# Patient Record
Sex: FEMALE | Race: OTHER | URBAN - METROPOLITAN AREA
[De-identification: names, ages, dates, MRNs, and addresses within clinical notes are randomized per-mention and may not be internally consistent; named-entity substitution may affect disease eponyms.]

---

## 2020-08-14 NOTE — PATIENT DISCUSSION
"""Dry eyes OU seen on todays exam."" ""Continue Artificial tears both eyes two - three times a day . """

## 2021-04-02 ENCOUNTER — IMPORTED ENCOUNTER (OUTPATIENT)
Dept: URBAN - METROPOLITAN AREA CLINIC 9 | Facility: CLINIC | Age: 66
End: 2021-04-02

## 2021-04-02 PROBLEM — H25.13: Noted: 2021-04-02

## 2021-04-02 PROBLEM — H25.13: Status: DETERIORATING | Noted: 2021-04-02

## 2021-04-02 PROBLEM — H40.1131: Noted: 2021-04-02

## 2021-04-02 PROBLEM — H40.1131: Status: STABILIZING | Noted: 2021-04-02

## 2021-10-16 ASSESSMENT — TONOMETRY
OS_IOP_MMHG: 17
OD_IOP_MMHG: 20

## 2021-10-16 ASSESSMENT — VISUAL ACUITY
OS_CC: 20/25 SN
OD_CC: 20/40 SN

## 2021-12-06 ENCOUNTER — TECH ONLY (OUTPATIENT)
Dept: URBAN - METROPOLITAN AREA CLINIC 15 | Facility: CLINIC | Age: 66
End: 2021-12-06

## 2021-12-06 DIAGNOSIS — H40.1131: ICD-10-CM

## 2021-12-06 PROCEDURE — 99211T TECH SERVICE

## 2021-12-06 ASSESSMENT — TONOMETRY
OS_IOP_MMHG: 15
OD_IOP_MMHG: 15

## 2022-02-01 ENCOUNTER — ESTABLISHED PATIENT (OUTPATIENT)
Dept: URBAN - METROPOLITAN AREA CLINIC 15 | Facility: CLINIC | Age: 67
End: 2022-02-01

## 2022-02-01 DIAGNOSIS — H25.13: ICD-10-CM

## 2022-02-01 DIAGNOSIS — H40.1131: ICD-10-CM

## 2022-02-01 PROCEDURE — 92133 CPTRZD OPH DX IMG PST SGM ON: CPT

## 2022-02-01 PROCEDURE — 92014 COMPRE OPH EXAM EST PT 1/>: CPT

## 2022-02-01 ASSESSMENT — VISUAL ACUITY
OD_CC: 20/30+1
OS_CC: 20/25-2

## 2022-02-01 ASSESSMENT — TONOMETRY
OD_IOP_MMHG: 16
OS_IOP_MMHG: 17

## 2022-07-07 RX ORDER — ALBUTEROL SULFATE 90 UG/1
AEROSOL, METERED RESPIRATORY (INHALATION)
COMMUNITY
Start: 2022-03-29

## 2022-07-07 RX ORDER — GUAIFENESIN 600 MG/1
TABLET, EXTENDED RELEASE ORAL
COMMUNITY

## 2022-07-07 RX ORDER — METOPROLOL SUCCINATE 50 MG/1
TABLET, EXTENDED RELEASE ORAL
COMMUNITY

## 2022-07-07 RX ORDER — EPINEPHRINE 0.3 MG/.3ML
INJECTION SUBCUTANEOUS
COMMUNITY

## 2022-07-07 RX ORDER — PRAVASTATIN SODIUM 40 MG
TABLET ORAL
COMMUNITY

## 2022-07-07 RX ORDER — LATANOPROST 50 UG/ML
SOLUTION/ DROPS OPHTHALMIC
COMMUNITY

## 2022-08-26 ENCOUNTER — ESTABLISHED PATIENT (OUTPATIENT)
Dept: URBAN - METROPOLITAN AREA CLINIC 14 | Facility: CLINIC | Age: 67
End: 2022-08-26

## 2022-08-26 DIAGNOSIS — H25.13: ICD-10-CM

## 2022-08-26 DIAGNOSIS — H40.1131: ICD-10-CM

## 2022-08-26 PROCEDURE — 92015 DETERMINE REFRACTIVE STATE: CPT

## 2022-08-26 PROCEDURE — 92014 COMPRE OPH EXAM EST PT 1/>: CPT

## 2022-08-26 ASSESSMENT — VISUAL ACUITY
OD_CC: 20/200
OS_CC: 20/50
OU_CC: 20/40

## 2022-08-26 ASSESSMENT — TONOMETRY
OS_IOP_MMHG: 16
OD_IOP_MMHG: 16

## 2022-10-06 NOTE — PATIENT DISCUSSION
Discussed diagnosis of subconjunctival hemorrhage with the patient. Explained that the patient should monitor for any additional unexplained bruising or bleeding. If they do experience this, follow up with their primary care physician to evaluate for a blood clotting disorder. Continue to monitor. The patient can use artificial tears as needed for itching and irritation.

## 2022-11-16 ENCOUNTER — TECH ONLY (OUTPATIENT)
Dept: URBAN - METROPOLITAN AREA CLINIC 6 | Facility: CLINIC | Age: 67
End: 2022-11-16

## 2022-11-16 DIAGNOSIS — H40.1131: ICD-10-CM

## 2022-11-16 PROCEDURE — 92083 EXTENDED VISUAL FIELD XM: CPT

## 2022-12-06 ENCOUNTER — ESTABLISHED PATIENT (OUTPATIENT)
Dept: URBAN - METROPOLITAN AREA CLINIC 14 | Facility: CLINIC | Age: 67
End: 2022-12-06

## 2022-12-06 PROCEDURE — 92133 CPTRZD OPH DX IMG PST SGM ON: CPT

## 2022-12-06 PROCEDURE — 92012 INTRM OPH EXAM EST PATIENT: CPT

## 2022-12-06 ASSESSMENT — VISUAL ACUITY
OS_CC: 20/50-2
OD_CC: 20/40

## 2022-12-06 ASSESSMENT — TONOMETRY
OD_IOP_MMHG: 17
OS_IOP_MMHG: 16

## 2023-04-04 ENCOUNTER — FOLLOW UP (OUTPATIENT)
Dept: URBAN - METROPOLITAN AREA CLINIC 14 | Facility: CLINIC | Age: 68
End: 2023-04-04

## 2023-04-04 DIAGNOSIS — H25.13: ICD-10-CM

## 2023-04-04 DIAGNOSIS — G43.B0: ICD-10-CM

## 2023-04-04 DIAGNOSIS — H40.1131: ICD-10-CM

## 2023-04-04 PROCEDURE — 92012 INTRM OPH EXAM EST PATIENT: CPT

## 2023-04-04 ASSESSMENT — VISUAL ACUITY
OD_CC: 20/50+2
OS_CC: 20/50-2

## 2023-04-04 ASSESSMENT — TONOMETRY
OS_IOP_MMHG: 10
OD_IOP_MMHG: 12

## 2023-04-13 ENCOUNTER — ESTABLISHED PATIENT (OUTPATIENT)
Dept: URBAN - METROPOLITAN AREA CLINIC 14 | Facility: CLINIC | Age: 68
End: 2023-04-13

## 2023-04-13 DIAGNOSIS — H25.13: ICD-10-CM

## 2023-04-13 PROCEDURE — 99214 OFFICE O/P EST MOD 30 MIN: CPT

## 2023-04-13 PROCEDURE — 92136 OPHTHALMIC BIOMETRY: CPT

## 2023-04-13 ASSESSMENT — VISUAL ACUITY
OS_CC: 20/60
OD_BCVA: 20/40
OD_CC: 20/50
OS_BCVA: 20/50

## 2023-04-13 ASSESSMENT — KERATOMETRY
OS_K1POWER_DIOPTERS: 43.00
OD_AXISANGLE2_DEGREES: 160
OS_AXISANGLE2_DEGREES: 12
OS_AXISANGLE_DEGREES: 102
OD_K2POWER_DIOPTERS: 44.00
OS_K2POWER_DIOPTERS: 44.50
OD_AXISANGLE_DEGREES: 70
OD_K1POWER_DIOPTERS: 43.00

## 2023-04-13 ASSESSMENT — TONOMETRY
OD_IOP_MMHG: 15
OS_IOP_MMHG: 16

## 2023-04-24 ENCOUNTER — ESTABLISHED PATIENT (OUTPATIENT)
Dept: URBAN - METROPOLITAN AREA CLINIC 14 | Facility: CLINIC | Age: 68
End: 2023-04-24

## 2023-04-24 DIAGNOSIS — H52.7: ICD-10-CM

## 2023-04-24 DIAGNOSIS — H25.13: ICD-10-CM

## 2023-04-24 PROCEDURE — 99214 OFFICE O/P EST MOD 30 MIN: CPT

## 2023-04-24 PROCEDURE — 99199ADVT ADVANCED VISION TESTING

## 2023-04-24 ASSESSMENT — KERATOMETRY
OS_K1POWER_DIOPTERS: 43.00
OD_AXISANGLE2_DEGREES: 160
OD_AXISANGLE_DEGREES: 70
OD_K1POWER_DIOPTERS: 43.00
OS_AXISANGLE2_DEGREES: 12
OS_AXISANGLE_DEGREES: 102
OD_K2POWER_DIOPTERS: 44.00
OS_K2POWER_DIOPTERS: 44.50

## 2023-05-09 ASSESSMENT — KERATOMETRY
OS_AXISANGLE_DEGREES: 102
OS_K1POWER_DIOPTERS: 43.00
OS_AXISANGLE2_DEGREES: 12
OD_K2POWER_DIOPTERS: 44.00
OD_K1POWER_DIOPTERS: 43.00
OS_K2POWER_DIOPTERS: 44.50
OD_AXISANGLE2_DEGREES: 160
OD_AXISANGLE_DEGREES: 70

## 2023-05-10 ENCOUNTER — POST-OP (OUTPATIENT)
Dept: URBAN - METROPOLITAN AREA CLINIC 14 | Facility: CLINIC | Age: 68
End: 2023-05-10

## 2023-05-10 DIAGNOSIS — H25.11: ICD-10-CM

## 2023-05-10 DIAGNOSIS — H52.7: ICD-10-CM

## 2023-05-10 DIAGNOSIS — Z96.1: ICD-10-CM

## 2023-05-10 PROCEDURE — 99024 POSTOP FOLLOW-UP VISIT: CPT

## 2023-05-10 ASSESSMENT — VISUAL ACUITY
OD_BCVA: 20/40
OS_PH: 20/40+1
OS_SC: 20/50-1
OD_CC: 20/50

## 2023-05-10 ASSESSMENT — TONOMETRY
OD_IOP_MMHG: 13
OS_IOP_MMHG: 14

## 2023-05-17 ENCOUNTER — POST-OP (OUTPATIENT)
Dept: URBAN - METROPOLITAN AREA CLINIC 14 | Facility: CLINIC | Age: 68
End: 2023-05-17

## 2023-05-17 DIAGNOSIS — Z96.1: ICD-10-CM

## 2023-05-17 PROCEDURE — 99024 POSTOP FOLLOW-UP VISIT: CPT

## 2023-05-17 ASSESSMENT — TONOMETRY
OD_IOP_MMHG: 12
OS_IOP_MMHG: 11

## 2023-05-17 ASSESSMENT — VISUAL ACUITY
OS_PH: 20/25-2
OS_SC: 20/40+2
OD_SC: 20/25-1
OU_SC: 20/25-1

## 2023-06-06 ENCOUNTER — POST-OP (OUTPATIENT)
Dept: URBAN - METROPOLITAN AREA CLINIC 14 | Facility: CLINIC | Age: 68
End: 2023-06-06

## 2023-06-06 DIAGNOSIS — Z96.1: ICD-10-CM

## 2023-06-06 PROCEDURE — 99024 POSTOP FOLLOW-UP VISIT: CPT

## 2023-06-06 ASSESSMENT — TONOMETRY
OD_IOP_MMHG: 13
OS_IOP_MMHG: 12

## 2023-06-06 ASSESSMENT — VISUAL ACUITY
OU_SC: 20/20
OD_SC: 20/20-1
OS_BCVA: 20/20
OD_BCVA: 20/20
OS_SC: 20/20

## 2023-07-06 ENCOUNTER — POST-OP (OUTPATIENT)
Dept: URBAN - METROPOLITAN AREA CLINIC 14 | Facility: CLINIC | Age: 68
End: 2023-07-06

## 2023-07-06 DIAGNOSIS — Z96.1: ICD-10-CM

## 2023-07-06 PROCEDURE — 99024 POSTOP FOLLOW-UP VISIT: CPT

## 2023-07-06 ASSESSMENT — VISUAL ACUITY
OD_SC: 20/30
OS_SC: 20/30

## 2023-07-06 ASSESSMENT — TONOMETRY
OD_IOP_MMHG: 14
OS_IOP_MMHG: 13

## 2023-07-11 ENCOUNTER — PREPPED CHART (OUTPATIENT)
Dept: URBAN - METROPOLITAN AREA CLINIC 14 | Facility: CLINIC | Age: 68
End: 2023-07-11

## 2023-09-19 ENCOUNTER — POST-OP (OUTPATIENT)
Dept: URBAN - METROPOLITAN AREA CLINIC 14 | Facility: CLINIC | Age: 68
End: 2023-09-19

## 2023-09-19 DIAGNOSIS — H40.1131: ICD-10-CM

## 2023-09-19 DIAGNOSIS — Z98.890: ICD-10-CM

## 2023-09-19 DIAGNOSIS — Z96.1: ICD-10-CM

## 2023-09-19 DIAGNOSIS — G43.B0: ICD-10-CM

## 2023-09-19 PROCEDURE — 99199CT CLINICAL TRIAL VISIT

## 2023-09-19 ASSESSMENT — TONOMETRY
OS_IOP_MMHG: 13
OD_IOP_MMHG: 12

## 2023-09-25 ENCOUNTER — CLINICAL TRIAL VISIT (OUTPATIENT)
Dept: URBAN - METROPOLITAN AREA CLINIC 14 | Facility: CLINIC | Age: 68
End: 2023-09-25

## 2023-09-25 DIAGNOSIS — G43.B0: ICD-10-CM

## 2023-09-25 DIAGNOSIS — Z98.890: ICD-10-CM

## 2023-09-25 DIAGNOSIS — H40.1131: ICD-10-CM

## 2023-09-25 DIAGNOSIS — Z96.1: ICD-10-CM

## 2023-09-25 PROCEDURE — 99199CT CLINICAL TRIAL VISIT

## 2023-10-06 ENCOUNTER — FOLLOW UP (OUTPATIENT)
Dept: URBAN - METROPOLITAN AREA CLINIC 14 | Facility: CLINIC | Age: 68
End: 2023-10-06

## 2023-10-06 DIAGNOSIS — H40.1131: ICD-10-CM

## 2023-10-06 PROCEDURE — 99213 OFFICE O/P EST LOW 20 MIN: CPT

## 2023-10-06 ASSESSMENT — TONOMETRY
OS_IOP_MMHG: 13
OS_IOP_MMHG: 14
OD_IOP_MMHG: 13
OD_IOP_MMHG: 13

## 2023-10-06 ASSESSMENT — VISUAL ACUITY
OS_SC: 20/30-1
OD_SC: 20/30
OU_SC: 20/25

## 2024-01-08 ENCOUNTER — FOLLOW UP (OUTPATIENT)
Dept: URBAN - METROPOLITAN AREA CLINIC 14 | Facility: CLINIC | Age: 69
End: 2024-01-08

## 2024-01-08 DIAGNOSIS — H40.1131: ICD-10-CM

## 2024-01-08 PROCEDURE — 99213 OFFICE O/P EST LOW 20 MIN: CPT

## 2024-01-08 ASSESSMENT — VISUAL ACUITY
OD_SC: 20/30-1
OS_SC: 20/25-1

## 2024-01-08 ASSESSMENT — TONOMETRY
OD_IOP_MMHG: 11
OS_IOP_MMHG: 11

## 2024-02-21 ENCOUNTER — ESTABLISHED PATIENT (OUTPATIENT)
Dept: URBAN - METROPOLITAN AREA CLINIC 14 | Facility: CLINIC | Age: 69
End: 2024-02-21

## 2024-02-21 DIAGNOSIS — Z98.890: ICD-10-CM

## 2024-02-21 DIAGNOSIS — H40.1131: ICD-10-CM

## 2024-02-21 PROCEDURE — 92083 EXTENDED VISUAL FIELD XM: CPT

## 2024-02-21 PROCEDURE — 99211T TECH SERVICE

## 2024-02-22 ENCOUNTER — FOLLOW UP (OUTPATIENT)
Dept: URBAN - METROPOLITAN AREA CLINIC 14 | Facility: CLINIC | Age: 69
End: 2024-02-22

## 2024-02-22 DIAGNOSIS — H40.1131: ICD-10-CM

## 2024-02-22 DIAGNOSIS — G43.B0: ICD-10-CM

## 2024-02-22 PROCEDURE — 99213 OFFICE O/P EST LOW 20 MIN: CPT

## 2024-02-22 ASSESSMENT — TONOMETRY
OD_IOP_MMHG: 18
OS_IOP_MMHG: 18

## 2024-02-22 ASSESSMENT — VISUAL ACUITY
OD_SC: 20/40
OS_SC: 20/30

## 2024-03-22 ENCOUNTER — FOLLOW UP (OUTPATIENT)
Dept: URBAN - METROPOLITAN AREA CLINIC 14 | Facility: CLINIC | Age: 69
End: 2024-03-22

## 2024-03-22 DIAGNOSIS — G43.B0: ICD-10-CM

## 2024-03-22 DIAGNOSIS — Z98.890: ICD-10-CM

## 2024-03-22 DIAGNOSIS — H40.1131: ICD-10-CM

## 2024-03-22 PROCEDURE — 92133 CPTRZD OPH DX IMG PST SGM ON: CPT

## 2024-03-22 PROCEDURE — 99213 OFFICE O/P EST LOW 20 MIN: CPT

## 2024-03-22 ASSESSMENT — TONOMETRY
OD_IOP_MMHG: 16
OS_IOP_MMHG: 14

## 2024-03-22 ASSESSMENT — VISUAL ACUITY
OU_SC: 20/25-1
OS_SC: 20/25-1
OD_SC: 20/25-1

## 2024-06-24 ENCOUNTER — FOLLOW UP (OUTPATIENT)
Dept: URBAN - METROPOLITAN AREA CLINIC 16 | Facility: CLINIC | Age: 69
End: 2024-06-24

## 2024-06-24 ASSESSMENT — VISUAL ACUITY
OD_SC: 20/30-2
OS_SC: 20/25+3
OD_SC: J10-2
OS_SC: J10
OU_SC: J10

## 2024-06-24 ASSESSMENT — TONOMETRY
OS_IOP_MMHG: 13
OD_IOP_MMHG: 16

## 2025-01-06 ENCOUNTER — COMPREHENSIVE EXAM (OUTPATIENT)
Age: 70
End: 2025-01-06

## 2025-01-06 DIAGNOSIS — G43.B0: ICD-10-CM

## 2025-01-06 DIAGNOSIS — Z96.1: ICD-10-CM

## 2025-01-06 DIAGNOSIS — H40.1131: ICD-10-CM

## 2025-01-06 PROCEDURE — 92015 DETERMINE REFRACTIVE STATE: CPT

## 2025-01-06 PROCEDURE — 92014 COMPRE OPH EXAM EST PT 1/>: CPT

## 2025-01-06 PROCEDURE — 92133 CPTRZD OPH DX IMG PST SGM ON: CPT

## 2025-07-02 ENCOUNTER — TECH ONLY (OUTPATIENT)
Age: 70
End: 2025-07-02